# Patient Record
(demographics unavailable — no encounter records)

---

## 2024-10-11 NOTE — DATA REVIEWED
[de-identified] : b mhl poor disc AS<AD may also relate to language barrier [de-identified] : prior ct and mri reviewed

## 2024-10-11 NOTE — HISTORY OF PRESENT ILLNESS
[de-identified] : 4 mo follow up appt for this 77 yo f, s/p multiple ear surgeries, chronic disequilibrium, recurrent ear drainage. She was here with her daughter. She was away in [place] and fell and hit head in August, went to ER in [place] and required sutures. Her daughter said she had negative head x-rays but has not had ct or mri since the fall. her family brought her back from [place] and she remains unsteady. Her daughter explained that they are in the process of getting all of her records together and transferring all of her care to [facility]. She said she will see Dr Vizcaino, another neurotologist, there but they also wanted to continue care with me. She has not sen a neurologist. She has also had craniotomy in the past.

## 2024-10-11 NOTE — ASSESSMENT
[FreeTextEntry1] : dizziness, disequiliibrium mri, ct ordered to assess brain and ears floxin drops dep rtc with studies

## 2024-10-11 NOTE — PHYSICAL EXAM
[Normal] : no rashes [de-identified] : r tm intact scant white drainage; l tm perf scant yellow drainage, examined under microscope [] : [place]-Hallpike test is negative [FreeTextEntry2] : VII intact [de-identified] : could not follow finger [de-identified] : gait steady

## 2025-01-24 NOTE — PROCEDURE
[Cerumen Impaction] : Cerumen Impaction [Same] : same as the Pre Op Dx. [] : Debridement of Mastoid [FreeTextEntry6] : b copious cerumen noted, r cerumen removed atraumatically with curette and alligator forceps, l cerumen removed with curette, b tm intact

## 2025-01-24 NOTE — PHYSICAL EXAM
Last seen 4-14-21. Dr. Medina patient.    [Normal] : external ears are normal bilaterally [de-identified] : b copious cerumen noted, r cerumen removed atraumatically with curette and alligator forceps, l cerumen removed with curette, l anterior perf some glistening no drainage or purulence [FreeTextEntry2] : Mask-like [de-identified] : cannot follow finger [de-identified] : shuffling gait, ambulating by walker

## 2025-01-24 NOTE — ASSESSMENT
[FreeTextEntry1] : 1. cerumen impaction, R -r copious cerumen noted, atraumatically removed with curette, r tm intact; l cerumen removed scant glistening in small ant perf, no purulence -ear felt better -avoid qtip use   2. disequlibrium  -f/u with neurologist v rehab ordered -daughter is trying to get for home 3. chronic mastoiditis, l -debrided with curette, no acute infection noted  3. hl - getting new ear mold today  RTC in x4 months

## 2025-01-24 NOTE — HISTORY OF PRESENT ILLNESS
[de-identified] : 3 month follow up visit for this 80 y/o F with h/o multiple ear surgeries, chronic disequilibrium, recurrent ear drainage. She is accompanied by daughter. Daughter states that patient was seen by neurologist and was informed that patient may be showing signs of Parkinson's disease. She was started on carbidopa/levodopa last Tuesday. daughter noticed some improvement in patient's disequilibrium. She denies any change in hearing. Denies pain or drainage.